# Patient Record
Sex: FEMALE | URBAN - METROPOLITAN AREA
[De-identification: names, ages, dates, MRNs, and addresses within clinical notes are randomized per-mention and may not be internally consistent; named-entity substitution may affect disease eponyms.]

---

## 2018-08-13 ENCOUNTER — IMPORTED ENCOUNTER (OUTPATIENT)
Dept: URBAN - METROPOLITAN AREA CLINIC 31 | Facility: CLINIC | Age: 68
End: 2018-08-13

## 2018-08-13 PROBLEM — H25.813: Noted: 2018-08-13

## 2018-08-13 PROBLEM — H40.033: Noted: 2018-08-13

## 2018-08-13 PROCEDURE — 92015 DETERMINE REFRACTIVE STATE: CPT

## 2018-08-13 PROCEDURE — 99214 OFFICE O/P EST MOD 30 MIN: CPT

## 2018-08-13 NOTE — PATIENT DISCUSSION
1.  Discussed the risks benefits alternatives and limitations of cataract surgery including infection bleeding loss of vision retinal tears detachment. The patient stated a full understanding and a desire to proceed with the procedure in both eyes. Patient has elected to be optimized for distance vision in both eyes. The patient will still need glasses for reading and to possibly fine tune distance vision. ELECTIVE CHARACTER OF SURGERY DISCUSSED - THE PATIENT STILL WANTS TO GO AHEAD DUE TO VISUAL SYMPTOMS CAUSED BY CATARACT. Cataract SURGERY OD/OS. will discuss refracyive options at [address] [facility].   Narrow angles OU: the patient is interetsed in cataract surgery in coming week therefore will perform dilated fundus exam post cataract surgery to avoid the risk of angle closure due to dilation of pupils

## 2018-08-16 ENCOUNTER — IMPORTED ENCOUNTER (OUTPATIENT)
Dept: URBAN - METROPOLITAN AREA CLINIC 31 | Facility: CLINIC | Age: 68
End: 2018-08-16

## 2018-08-16 PROBLEM — H25.813: Noted: 2018-08-16

## 2018-08-16 PROBLEM — H40.033: Noted: 2018-08-16

## 2018-08-16 PROCEDURE — 99213 OFFICE O/P EST LOW 20 MIN: CPT

## 2018-08-16 NOTE — PATIENT DISCUSSION
Narrow angles OU: Risk of angle closure discussed. option of Yag PI vs cataract extraction discussed. Pt would prefer to proceed with cataract surgery rather than have two procedures.

## 2018-08-16 NOTE — PATIENT DISCUSSION
1.  Discussed the risks benefits alternatives and limitations of cataract surgery including infection bleeding loss of vision retinal tears detachment. The patient stated a full understanding and a desire to proceed with the procedure in both eyes. Refractive options were reviewed. Patient has elected to be optimized for distance vision in both eyes. The patient will still need glasses for reading and to possibly fine tune distance vision. Schedule KPE/IOL OS/OD pore plano standard and MFIOL discussed. OK for 15% discount. 2. Narrow angles OU: Risk of angle closure discussed. option of Yag PI vs cataract extraction discussed. Pt would prefer to proceed with cataract surgery rather than have two procedures.

## 2018-09-26 ENCOUNTER — IMPORTED ENCOUNTER (OUTPATIENT)
Dept: URBAN - METROPOLITAN AREA CLINIC 31 | Facility: CLINIC | Age: 68
End: 2018-09-26

## 2018-09-26 PROBLEM — H25.813: Noted: 2018-09-26

## 2018-09-26 PROCEDURE — 76519 ECHO EXAM OF EYE: CPT

## 2018-09-26 NOTE — PATIENT DISCUSSION
1.  Discussed the risks benefits alternatives and limitations of cataract surgery including infection bleeding loss of vision retinal tears detachment. The patient stated a full understanding and a desire to proceed with the procedure in both eyes. Refractive options were reviewed. Patient has elected to be optimized for distance vision in both eyes. The patient will still need glasses for reading and to possibly fine tune distance vision. 2.  h/o Rash Left forehead doubt zoster but better to do cataract OD first and hold on OS until later.   Will make sure eye has no inflammation before proceeding with surgerySchedule KPE/IOL OD/OS

## 2018-09-26 NOTE — PATIENT DISCUSSION
h/o Rash Left forehead doubt zoster but better to do cataract OD first and hold on OS until later.   Will make sure eye has no inflammation before proceeding with surgerySchedule KPE/IOL OD/OS

## 2018-10-09 ENCOUNTER — IMPORTED ENCOUNTER (OUTPATIENT)
Dept: URBAN - METROPOLITAN AREA CLINIC 31 | Facility: CLINIC | Age: 68
End: 2018-10-09

## 2018-10-09 PROBLEM — Z96.1: Noted: 2018-10-09

## 2018-10-09 PROCEDURE — 99024 POSTOP FOLLOW-UP VISIT: CPT

## 2018-10-16 ENCOUNTER — IMPORTED ENCOUNTER (OUTPATIENT)
Dept: URBAN - METROPOLITAN AREA CLINIC 31 | Facility: CLINIC | Age: 68
End: 2018-10-16

## 2018-10-16 PROBLEM — Z98.89: Noted: 2018-10-16

## 2018-10-16 PROBLEM — Z96.1: Noted: 2018-10-16

## 2018-10-16 PROCEDURE — 99024 POSTOP FOLLOW-UP VISIT: CPT

## 2018-10-16 NOTE — PATIENT DISCUSSION
1.  Post-Op Day #1 - Cataract Surgery Left Eye (OS) - doing well. Tears prn. Continue postop drops as directed. Call office with symptoms of pain redness or decreased vision in operative eye.  1 drop of zylet instilled2. Post-Op Week #1 - Cataract Surgery Right Eye (OD) - Intraocular lens stable and surgery very well healed. Patient to resume all normal activities. Finish postop drops as directed. Final Refraction given if necessary. Call with any problems. 3. Return for an appointment in 1 week for post op refraction. with Dr. Emily Denton.

## 2018-10-23 ENCOUNTER — IMPORTED ENCOUNTER (OUTPATIENT)
Dept: URBAN - METROPOLITAN AREA CLINIC 31 | Facility: CLINIC | Age: 68
End: 2018-10-23

## 2018-10-23 PROBLEM — Z96.1: Noted: 2018-10-23

## 2018-10-23 PROCEDURE — 99024 POSTOP FOLLOW-UP VISIT: CPT

## 2018-10-23 NOTE — PATIENT DISCUSSION
Post-Op Cataract Surgery 15-90 days Both Eyes (OU)-  Doing well with stable vision. Return for an appointment in 4 months for dilated fundus exam. with Dr. Sofie Coelho.

## 2019-01-03 ENCOUNTER — IMPORTED ENCOUNTER (OUTPATIENT)
Dept: URBAN - METROPOLITAN AREA CLINIC 31 | Facility: CLINIC | Age: 69
End: 2019-01-03

## 2019-01-03 PROBLEM — H10.403: Noted: 2019-01-03

## 2019-01-03 PROBLEM — H04.123: Noted: 2019-01-03

## 2019-01-03 PROBLEM — Z96.1: Noted: 2019-01-03

## 2019-01-03 PROBLEM — Z98.89: Noted: 2019-01-11

## 2019-01-03 PROCEDURE — 99024 POSTOP FOLLOW-UP VISIT: CPT

## 2019-01-03 NOTE — PATIENT DISCUSSION
1.  Pseudophakia OU - IOLs stable. Monitor. 2. Dry Eye OU:  Continue current management with Artificial Tears. 3.  Allergic Conjunctivitis OU -- The condition was  discussed with the patient. Avoidance of allergens and cool compresses were recommended. zaditor prnReturn for an appointment in 12 months for comprehensive exam. with Dr. Barbar Castleman.

## 2019-01-03 NOTE — PATIENT DISCUSSION
Allergic Conjunctivitis OU -- The condition was  discussed with the patient. Avoidance of allergens and cool compresses were recommended. zaditor prnReturn for an appointment in 12 months for comprehensive exam. with Dr. Goran Washington

## 2022-04-02 ASSESSMENT — TONOMETRY
OD_IOP_MMHG: 13
OD_IOP_MMHG: 16
OS_IOP_MMHG: 13
OD_IOP_MMHG: 10
OS_IOP_MMHG: 10
OD_IOP_MMHG: 12
OD_IOP_MMHG: 12
OS_IOP_MMHG: 12
OS_IOP_MMHG: 11

## 2022-04-02 ASSESSMENT — VISUAL ACUITY
OD_CC: 20/25
OD_GLARE: 20/30MED
OU_SC: 20/3016''
OS_CC: 20/20
OD_CC: 20/25-1
OU_CC: 20/2016''
OS_SC: 20/3016''
OS_SC: 20/20-3
OD_CC: 20/25-2
OS_SC: 20/30-1
OD_PH: CC 20/25 -2
OD_SC: 20/20
OS_CC: 20/20-2
OS_GLARE: 20/30MED
OD_SC: 20/3016''
OS_CC: 20/25
OS_SC: 20/30-2
OD_CC: 20/25
OU_CC: 20/2516''
OD_SC: 20/40+2

## 2022-07-09 ENCOUNTER — TELEPHONE ENCOUNTER (OUTPATIENT)
Dept: URBAN - METROPOLITAN AREA CLINIC 121 | Facility: CLINIC | Age: 72
End: 2022-07-09

## 2022-07-10 ENCOUNTER — TELEPHONE ENCOUNTER (OUTPATIENT)
Dept: URBAN - METROPOLITAN AREA CLINIC 121 | Facility: CLINIC | Age: 72
End: 2022-07-10